# Patient Record
Sex: MALE | NOT HISPANIC OR LATINO | Employment: FULL TIME
[De-identification: names, ages, dates, MRNs, and addresses within clinical notes are randomized per-mention and may not be internally consistent; named-entity substitution may affect disease eponyms.]

---

## 2022-06-22 ENCOUNTER — TRANSCRIBE ORDERS (OUTPATIENT)
Dept: OTHER | Age: 37
End: 2022-06-22

## 2022-06-22 DIAGNOSIS — I21.02 STEMI INVOLVING LEFT ANTERIOR DESCENDING CORONARY ARTERY (H): Primary | ICD-10-CM

## 2022-07-01 ENCOUNTER — HOSPITAL ENCOUNTER (OUTPATIENT)
Dept: CARDIAC REHAB | Facility: HOSPITAL | Age: 37
Discharge: HOME | End: 2022-07-01
Attending: INTERNAL MEDICINE
Payer: COMMERCIAL

## 2022-07-01 DIAGNOSIS — I21.02 STEMI INVOLVING LEFT ANTERIOR DESCENDING CORONARY ARTERY (H): ICD-10-CM

## 2022-07-01 PROCEDURE — 93798 PHYS/QHP OP CAR RHAB W/ECG: CPT

## 2022-07-01 PROCEDURE — 93797 PHYS/QHP OP CAR RHAB WO ECG: CPT

## 2022-07-11 ENCOUNTER — HOSPITAL ENCOUNTER (OUTPATIENT)
Dept: CARDIAC REHAB | Facility: HOSPITAL | Age: 37
Discharge: HOME | End: 2022-07-11
Attending: INTERNAL MEDICINE
Payer: COMMERCIAL

## 2022-07-11 PROCEDURE — 93798 PHYS/QHP OP CAR RHAB W/ECG: CPT

## 2022-07-20 ENCOUNTER — HOSPITAL ENCOUNTER (OUTPATIENT)
Dept: CARDIAC REHAB | Facility: HOSPITAL | Age: 37
Discharge: HOME | End: 2022-07-20
Attending: INTERNAL MEDICINE
Payer: COMMERCIAL

## 2022-07-24 ENCOUNTER — HEALTH MAINTENANCE LETTER (OUTPATIENT)
Age: 37
End: 2022-07-24

## 2022-07-25 ENCOUNTER — APPOINTMENT (OUTPATIENT)
Dept: CARDIOLOGY | Facility: HOSPITAL | Age: 37
End: 2022-07-25
Attending: INTERNAL MEDICINE
Payer: COMMERCIAL

## 2022-07-25 ENCOUNTER — HOSPITAL ENCOUNTER (OUTPATIENT)
Dept: CARDIAC REHAB | Facility: HOSPITAL | Age: 37
Discharge: HOME | End: 2022-07-25
Attending: INTERNAL MEDICINE
Payer: COMMERCIAL

## 2022-07-25 ENCOUNTER — APPOINTMENT (OUTPATIENT)
Dept: RADIOLOGY | Facility: HOSPITAL | Age: 37
End: 2022-07-25
Attending: EMERGENCY MEDICINE
Payer: COMMERCIAL

## 2022-07-25 ENCOUNTER — HOSPITAL ENCOUNTER (OUTPATIENT)
Facility: HOSPITAL | Age: 37
Setting detail: OBSERVATION
Discharge: HOME | End: 2022-07-26
Attending: EMERGENCY MEDICINE | Admitting: STUDENT IN AN ORGANIZED HEALTH CARE EDUCATION/TRAINING PROGRAM
Payer: COMMERCIAL

## 2022-07-25 DIAGNOSIS — R55 SYNCOPE, UNSPECIFIED SYNCOPE TYPE: ICD-10-CM

## 2022-07-25 DIAGNOSIS — I25.5 ISCHEMIC CARDIOMYOPATHY: Primary | ICD-10-CM

## 2022-07-25 PROBLEM — I21.02: Status: ACTIVE | Noted: 2022-07-25

## 2022-07-25 PROBLEM — R07.9 CHEST PAIN: Status: ACTIVE | Noted: 2022-06-13

## 2022-07-25 LAB
ANION GAP SERPL CALCULATED.3IONS-SCNC: 13 MMOL/L (ref 5–18)
ATRIAL RATE - MUSE: 104 BPM
BASE EXCESS BLDV CALC-SCNC: 2.3 MMOL/L
BASOPHILS # BLD AUTO: 0.1 10E3/UL (ref 0–0.2)
BASOPHILS NFR BLD AUTO: 0 %
BUN SERPL-MCNC: 14 MG/DL (ref 8–22)
CALCIUM SERPL-MCNC: 9.8 MG/DL (ref 8.5–10.5)
CHLORIDE BLD-SCNC: 102 MMOL/L (ref 98–107)
CO2 SERPL-SCNC: 22 MMOL/L (ref 22–31)
CREAT SERPL-MCNC: 1.25 MG/DL (ref 0.7–1.3)
CREAT SERPL-MCNC: 1.25 MG/DL (ref 0.7–1.3)
D DIMER PPP FEU-MCNC: <0.27 UG/ML FEU (ref 0–0.5)
DIASTOLIC BLOOD PRESSURE - MUSE: 54 MMHG
EOSINOPHIL # BLD AUTO: 0.5 10E3/UL (ref 0–0.7)
EOSINOPHIL NFR BLD AUTO: 4 %
ERYTHROCYTE [DISTWIDTH] IN BLOOD BY AUTOMATED COUNT: 12.6 % (ref 10–15)
GFR SERPL CREATININE-BSD FRML MDRD: 77 ML/MIN/1.73M2
GFR SERPL CREATININE-BSD FRML MDRD: 77 ML/MIN/1.73M2
GLUCOSE BLD-MCNC: 137 MG/DL (ref 70–125)
HCO3 BLDV-SCNC: 24 MMOL/L (ref 24–30)
HCT VFR BLD AUTO: 44 % (ref 40–53)
HGB BLD-MCNC: 14.3 G/DL (ref 13.3–17.7)
HOLD SPECIMEN: NORMAL
IMM GRANULOCYTES # BLD: 0.1 10E3/UL
IMM GRANULOCYTES NFR BLD: 0 %
INTERPRETATION ECG - MUSE: NORMAL
LACTATE SERPL-SCNC: 2 MMOL/L (ref 0.7–2)
LACTATE SERPL-SCNC: 2.2 MMOL/L (ref 0.7–2)
LVEF ECHO: NORMAL
LYMPHOCYTES # BLD AUTO: 3.7 10E3/UL (ref 0.8–5.3)
LYMPHOCYTES NFR BLD AUTO: 28 %
MCH RBC QN AUTO: 26.3 PG (ref 26.5–33)
MCHC RBC AUTO-ENTMCNC: 32.5 G/DL (ref 31.5–36.5)
MCV RBC AUTO: 81 FL (ref 78–100)
MONOCYTES # BLD AUTO: 1.1 10E3/UL (ref 0–1.3)
MONOCYTES NFR BLD AUTO: 9 %
NEUTROPHILS # BLD AUTO: 7.6 10E3/UL (ref 1.6–8.3)
NEUTROPHILS NFR BLD AUTO: 59 %
NRBC # BLD AUTO: 0 10E3/UL
NRBC BLD AUTO-RTO: 0 /100
OXYHGB MFR BLDV: 33.1 % (ref 70–75)
P AXIS - MUSE: 33 DEGREES
PCO2 BLDV: 48 MM HG (ref 35–50)
PH BLDV: 7.37 [PH] (ref 7.35–7.45)
PLATELET # BLD AUTO: 399 10E3/UL (ref 150–450)
PO2 BLDV: 22 MM HG (ref 25–47)
POTASSIUM BLD-SCNC: 3.5 MMOL/L (ref 3.5–5)
PR INTERVAL - MUSE: 144 MS
QRS DURATION - MUSE: 94 MS
QT - MUSE: 338 MS
QTC - MUSE: 444 MS
R AXIS - MUSE: -14 DEGREES
RBC # BLD AUTO: 5.44 10E6/UL (ref 4.4–5.9)
SAO2 % BLDV: 33.7 % (ref 70–75)
SARS-COV-2 RNA RESP QL NAA+PROBE: NEGATIVE
SODIUM SERPL-SCNC: 137 MMOL/L (ref 136–145)
SYSTOLIC BLOOD PRESSURE - MUSE: 106 MMHG
T AXIS - MUSE: 158 DEGREES
TROPONIN I SERPL-MCNC: 0.03 NG/ML (ref 0–0.29)
TROPONIN I SERPL-MCNC: 0.03 NG/ML (ref 0–0.29)
TROPONIN I SERPL-MCNC: 0.04 NG/ML (ref 0–0.29)
VENTRICULAR RATE- MUSE: 104 BPM
WBC # BLD AUTO: 12.9 10E3/UL (ref 4–11)

## 2022-07-25 PROCEDURE — C9803 HOPD COVID-19 SPEC COLLECT: HCPCS

## 2022-07-25 PROCEDURE — 255N000002 HC RX 255 OP 636: Performed by: STUDENT IN AN ORGANIZED HEALTH CARE EDUCATION/TRAINING PROGRAM

## 2022-07-25 PROCEDURE — 99255 IP/OBS CONSLTJ NEW/EST HI 80: CPT | Mod: 25 | Performed by: INTERNAL MEDICINE

## 2022-07-25 PROCEDURE — 120N000001 HC R&B MED SURG/OB

## 2022-07-25 PROCEDURE — 84484 ASSAY OF TROPONIN QUANT: CPT | Performed by: EMERGENCY MEDICINE

## 2022-07-25 PROCEDURE — 250N000013 HC RX MED GY IP 250 OP 250 PS 637

## 2022-07-25 PROCEDURE — G0378 HOSPITAL OBSERVATION PER HR: HCPCS

## 2022-07-25 PROCEDURE — 84484 ASSAY OF TROPONIN QUANT: CPT

## 2022-07-25 PROCEDURE — 84520 ASSAY OF UREA NITROGEN: CPT | Performed by: EMERGENCY MEDICINE

## 2022-07-25 PROCEDURE — U0005 INFEC AGEN DETEC AMPLI PROBE: HCPCS | Performed by: EMERGENCY MEDICINE

## 2022-07-25 PROCEDURE — 96360 HYDRATION IV INFUSION INIT: CPT

## 2022-07-25 PROCEDURE — 83605 ASSAY OF LACTIC ACID: CPT | Performed by: EMERGENCY MEDICINE

## 2022-07-25 PROCEDURE — 250N000013 HC RX MED GY IP 250 OP 250 PS 637: Performed by: INTERNAL MEDICINE

## 2022-07-25 PROCEDURE — 85025 COMPLETE CBC W/AUTO DIFF WBC: CPT | Performed by: EMERGENCY MEDICINE

## 2022-07-25 PROCEDURE — 99285 EMERGENCY DEPT VISIT HI MDM: CPT | Mod: 25

## 2022-07-25 PROCEDURE — 82805 BLOOD GASES W/O2 SATURATION: CPT | Performed by: EMERGENCY MEDICINE

## 2022-07-25 PROCEDURE — 85379 FIBRIN DEGRADATION QUANT: CPT | Performed by: EMERGENCY MEDICINE

## 2022-07-25 PROCEDURE — 36415 COLL VENOUS BLD VENIPUNCTURE: CPT

## 2022-07-25 PROCEDURE — 36415 COLL VENOUS BLD VENIPUNCTURE: CPT | Performed by: EMERGENCY MEDICINE

## 2022-07-25 PROCEDURE — 96361 HYDRATE IV INFUSION ADD-ON: CPT

## 2022-07-25 PROCEDURE — 71046 X-RAY EXAM CHEST 2 VIEWS: CPT

## 2022-07-25 PROCEDURE — 93005 ELECTROCARDIOGRAM TRACING: CPT | Performed by: EMERGENCY MEDICINE

## 2022-07-25 PROCEDURE — 258N000003 HC RX IP 258 OP 636: Performed by: EMERGENCY MEDICINE

## 2022-07-25 PROCEDURE — 93798 PHYS/QHP OP CAR RHAB W/ECG: CPT

## 2022-07-25 PROCEDURE — 83605 ASSAY OF LACTIC ACID: CPT

## 2022-07-25 PROCEDURE — 93306 TTE W/DOPPLER COMPLETE: CPT | Mod: 26 | Performed by: GENERAL ACUTE CARE HOSPITAL

## 2022-07-25 PROCEDURE — 99219 PR INITIAL OBSERVATION CARE,LEVEL II: CPT | Mod: GC

## 2022-07-25 RX ORDER — PROCHLORPERAZINE 25 MG
25 SUPPOSITORY, RECTAL RECTAL EVERY 12 HOURS PRN
Status: DISCONTINUED | OUTPATIENT
Start: 2022-07-25 | End: 2022-07-26 | Stop reason: HOSPADM

## 2022-07-25 RX ORDER — LOSARTAN POTASSIUM 25 MG/1
12.5 TABLET ORAL DAILY
COMMUNITY

## 2022-07-25 RX ORDER — METOPROLOL SUCCINATE 25 MG/1
25 TABLET, EXTENDED RELEASE ORAL 2 TIMES DAILY
Qty: 30 TABLET | Refills: 0
Start: 2022-07-25

## 2022-07-25 RX ORDER — ASPIRIN 81 MG/1
81 TABLET, CHEWABLE ORAL DAILY
COMMUNITY

## 2022-07-25 RX ORDER — ASPIRIN 81 MG/1
81 TABLET, CHEWABLE ORAL DAILY
Status: DISCONTINUED | OUTPATIENT
Start: 2022-07-26 | End: 2022-07-26 | Stop reason: HOSPADM

## 2022-07-25 RX ORDER — ENOXAPARIN SODIUM 100 MG/ML
40 INJECTION SUBCUTANEOUS EVERY 24 HOURS
Status: DISCONTINUED | OUTPATIENT
Start: 2022-07-25 | End: 2022-07-26 | Stop reason: HOSPADM

## 2022-07-25 RX ORDER — ACETAMINOPHEN 325 MG/1
650 TABLET ORAL EVERY 6 HOURS PRN
Status: DISCONTINUED | OUTPATIENT
Start: 2022-07-25 | End: 2022-07-26 | Stop reason: HOSPADM

## 2022-07-25 RX ORDER — ONDANSETRON 4 MG/1
4 TABLET, ORALLY DISINTEGRATING ORAL EVERY 6 HOURS PRN
Status: DISCONTINUED | OUTPATIENT
Start: 2022-07-25 | End: 2022-07-26 | Stop reason: HOSPADM

## 2022-07-25 RX ORDER — SODIUM CHLORIDE 9 MG/ML
INJECTION, SOLUTION INTRAVENOUS CONTINUOUS
Status: DISCONTINUED | OUTPATIENT
Start: 2022-07-25 | End: 2022-07-26 | Stop reason: HOSPADM

## 2022-07-25 RX ORDER — ROSUVASTATIN CALCIUM 40 MG/1
40 TABLET, COATED ORAL DAILY
Status: DISCONTINUED | OUTPATIENT
Start: 2022-07-26 | End: 2022-07-26 | Stop reason: HOSPADM

## 2022-07-25 RX ORDER — ROSUVASTATIN CALCIUM 40 MG/1
40 TABLET, COATED ORAL DAILY
COMMUNITY

## 2022-07-25 RX ORDER — ACETAMINOPHEN 650 MG/1
650 SUPPOSITORY RECTAL EVERY 6 HOURS PRN
Status: DISCONTINUED | OUTPATIENT
Start: 2022-07-25 | End: 2022-07-26 | Stop reason: HOSPADM

## 2022-07-25 RX ORDER — METOPROLOL SUCCINATE 25 MG/1
37.5 TABLET, EXTENDED RELEASE ORAL 2 TIMES DAILY
COMMUNITY
End: 2022-07-25

## 2022-07-25 RX ORDER — ONDANSETRON 2 MG/ML
4 INJECTION INTRAMUSCULAR; INTRAVENOUS EVERY 6 HOURS PRN
Status: DISCONTINUED | OUTPATIENT
Start: 2022-07-25 | End: 2022-07-26 | Stop reason: HOSPADM

## 2022-07-25 RX ORDER — LIDOCAINE 40 MG/G
CREAM TOPICAL
Status: DISCONTINUED | OUTPATIENT
Start: 2022-07-25 | End: 2022-07-26 | Stop reason: HOSPADM

## 2022-07-25 RX ORDER — PROCHLORPERAZINE MALEATE 10 MG
10 TABLET ORAL EVERY 6 HOURS PRN
Status: DISCONTINUED | OUTPATIENT
Start: 2022-07-25 | End: 2022-07-26 | Stop reason: HOSPADM

## 2022-07-25 RX ORDER — METOPROLOL SUCCINATE 25 MG/1
25 TABLET, EXTENDED RELEASE ORAL 2 TIMES DAILY
Status: DISCONTINUED | OUTPATIENT
Start: 2022-07-25 | End: 2022-07-26 | Stop reason: HOSPADM

## 2022-07-25 RX ADMIN — TICAGRELOR 90 MG: 90 TABLET ORAL at 20:54

## 2022-07-25 RX ADMIN — SODIUM CHLORIDE: 9 INJECTION, SOLUTION INTRAVENOUS at 09:04

## 2022-07-25 RX ADMIN — SODIUM CHLORIDE 1000 ML: 9 INJECTION, SOLUTION INTRAVENOUS at 07:57

## 2022-07-25 RX ADMIN — METOPROLOL SUCCINATE 25 MG: 25 TABLET, EXTENDED RELEASE ORAL at 20:55

## 2022-07-25 RX ADMIN — PERFLUTREN 2.5 ML: 6.52 INJECTION, SUSPENSION INTRAVENOUS at 16:20

## 2022-07-25 ASSESSMENT — ACTIVITIES OF DAILY LIVING (ADL)
ADLS_ACUITY_SCORE: 35

## 2022-07-25 NOTE — ED PROVIDER NOTES
EMERGENCY DEPARTMENT ENCOUNTER      NAME: Jordy Mcmanus  AGE: 36 year old male  YOB: 1985  MRN: [id number]  EVALUATION DATE & TIME: 7/25/2022  7:30 AM    PCP: System, Provider Not In    ED PROVIDER: Oskar Baker M.D.      Chief Complaint   Patient presents with     Syncope     SYNCOPE X3         FINAL IMPRESSION:  1. Syncope, unspecified syncope type          ED COURSE & MEDICAL DECISION MAKING:    Pertinent Labs & Imaging studies reviewed. (See chart for details)  36 year old male presents to the Emergency Department for evaluation of syncope. Patient appears non toxic with stable vitals signs, patient is afebrile, did note slight tachycardia but no hypoxia or increased work of breathing. Overall exam is benign.  Lungs are clear and abdomen is benign.  Patient denies any chest pain at any time today, no shortness of breath.  He was finishing his exercises in cardiac rehab when he had 3 reported episodes of syncope.  Did strike his head but he denies any head or neck pain, he is GCS of 15 with no focal neurodeficits.  Review of the medical record shows that in June 2022 patient presented to outside facility for left upper back and shoulder pain and was found to have STEMI, coronary angiogram demonstrated occluded LAD, during his coronary angiogram he went into cardiogenic shock and patient had emergent PCI.  He denies any fevers, change in bowel or bladder habits.  Considered but low suspicion for PE, concern for dysrhythmia, ACS, considered but low suspicion for anemia or electrolyte abnormality.  Patient has small forehead abrasion but no other outward signs of trauma, again denies any head or neck pain with a normal neurologic exam with certainly nothing to suggest depressed skull fracture, intracranial bleed or mass.  No C-spine tenderness.  Nothing to suggest cervical fracture or dislocation.  We will obtain screening labs, ECG, D-dimer and chest x-ray.    Reassessment: Troponin negative and  ECG nonischemic, did note slightly elevated lactic acid and white blood cell count of 12.9 but again no fever here or other infectious type symptoms, certainly nothing to suggest sepsis.  D-dimer was negative.  COVID-negative.  Plain films reported no acute concerning findings.  Repeat exam is benign.  That said, patient does have significant cardiac risk factors and has had recent cardiac intervention and feel he would benefit from admission for serial troponins and continued observation.  Discussed these findings recommendations with the patient who is in agreement.  Discussed care plans admitting service.    At the conclusion of the encounter I discussed the results of all of the tests and the disposition. The questions were answered and return precautions provided. The patient or family acknowledged understanding and was agreeable with the care plan.     8:15 AM I introduced myself to the patient, obtained patient history, performed a physical exam, and discussed plan for ED workup including potential diagnostic laboratory/imaging studies and interventions.     8:49 AM Rechecked and updated patient.  Patient appears comfortable but will admit for continued observation.    9:03 AM Spoke with [place] about admission to [facility].      MEDICATIONS GIVEN IN THE EMERGENCY:  Medications   0.9% sodium chloride BOLUS (0 mLs Intravenous Stopped 7/25/22 0905)     Followed by   sodium chloride 0.9% infusion ( Intravenous Rate/Dose Verify 7/25/22 1337)   lidocaine 1 % 0.1-1 mL (has no administration in time range)   lidocaine (LMX4) cream (has no administration in time range)   sodium chloride (PF) 0.9% PF flush 3 mL (3 mLs Intracatheter Not Given 7/25/22 0956)   sodium chloride (PF) 0.9% PF flush 3 mL (has no administration in time range)   melatonin tablet 1 mg (has no administration in time range)   enoxaparin ANTICOAGULANT (LOVENOX) injection 40 mg (40 mg Subcutaneous Not Given 7/25/22 1207)    acetaminophen (TYLENOL) tablet 650 mg (has no administration in time range)     Or   acetaminophen (TYLENOL) Suppository 650 mg (has no administration in time range)   ondansetron (ZOFRAN ODT) ODT tab 4 mg (has no administration in time range)     Or   ondansetron (ZOFRAN) injection 4 mg (has no administration in time range)   prochlorperazine (COMPAZINE) injection 10 mg (has no administration in time range)     Or   prochlorperazine (COMPAZINE) tablet 10 mg (has no administration in time range)     Or   prochlorperazine (COMPAZINE) suppository 25 mg (has no administration in time range)   aspirin (ASA) chewable tablet 81 mg (has no administration in time range)   losartan (COZAAR) half-tab 12.5 mg (has no administration in time range)   metoprolol succinate ER (TOPROL-XL) 24 hr half-tab 37.5 mg (has no administration in time range)   rosuvastatin (CRESTOR) tablet 40 mg (has no administration in time range)   ticagrelor (BRILINTA) tablet 90 mg (has no administration in time range)       NEW PRESCRIPTIONS STARTED AT TODAY'S ER VISIT  New Prescriptions    No medications on file            =================================================================    HPI    Patient information was obtained from: patient    Use of Intrepreter: N/A        Jordy Mcmanus is a 36 year old male with a past medical history of STEMI, chest pain, and ischemic cardiomyopathy who presents for evaluation of syncope. Patient arrived from cardiac rehab fter Rapid Response was called. Patient experienced three episodes of syncope. He also fell forward out of his chair and hit his head, causing an abrasion between his eyes. Patient says he thinks he passed out because of the mask they have him wear during exercise.    Upon encounter, patient denies chest pain and shortness of breath.    Per chart review, patient was seen on 6/12/22 by [facility] for evaluation of left upper back and shoulder pain. EKG showed ST elevation in anterior leads. During  the diagnostic coronary angiogram, the patient's blood  pressure deteriorated consistent with cardiogenic shock. The anterior STEMI  was aborted with PTCA to reestablish flow in the culprit LAD. Following  establishment of coronary flow to the LAD. Dr. Irvin Villanueva of CT surgery was consulted by phone to discuss options of emergent coronary artery bypass grafting. Based on the hour of the day it would have taken greater than 2 hours to mobilized the CT surgery team and revascularize the coronary arteries. As a result the recommendation was to proceed with emergent PCI due to cardiogenic shock.      REVIEW OF SYSTEMS   Constitutional:  Denies fever, chills  Respiratory:  Denies productive cough, shortness of breath, or increased work of breathing  Cardiovascular:  Denies chest pain, palpitations. Positive for syncope.  GI:  Denies abdominal pain, nausea, vomiting, or change in bowel or bladder habits   Musculoskeletal:  Denies any new muscle/joint swelling  Skin:  Denies rash. Positive for abrasion.   Neurologic:  Denies focal weakness  All systems negative except as marked.     PAST MEDICAL HISTORY:  History reviewed. No pertinent past medical history.    PAST SURGICAL HISTORY:  History reviewed. No pertinent surgical history.      CURRENT MEDICATIONS:    Prior to Admission medications    Not on File        ALLERGIES:  No Known Allergies    FAMILY HISTORY:  History reviewed. No pertinent family history.    SOCIAL HISTORY:   Social History     Socioeconomic History     Marital status: Single       VITALS:  Patient Vitals for the past 24 hrs:   BP Temp Temp src Pulse Resp SpO2 Height Weight   07/25/22 1100 102/61 -- -- 77 12 100 % -- --   07/25/22 0910 -- -- -- -- -- -- 1.829 m (6') 81.2 kg (179 lb)   07/25/22 0909 -- 98.7  F (37.1  C) Oral -- -- -- -- --   07/25/22 0900 106/68 -- -- 88 17 100 % -- --   07/25/22 0731 106/54 -- -- 105 16 99 % -- --        PHYSICAL EXAM    Constitutional:  Awake, alert, in no apparent  distress  HENT:  Normocephalic, Atraumatic. Bilateral external ears normal. Oropharynx moist. Nose normal. Neck- Normal range of motion with no guarding, No midline cervical tenderness, Supple, No stridor.   Eyes:  PERRL, EOMI with no signs of entrapment, Conjunctiva normal, No discharge.   Respiratory:  Normal breath sounds, No respiratory distress, No wheezing.    Cardiovascular:  Tachycardic, Normal rhythm, No appreciable rubs or gallops.   GI:  Soft, No tenderness, No distension, No palpable masses  Musculoskeletal:  Intact distal pulses, No edema. Good range of motion in all major joints. No tenderness to palpation or major deformities noted.  Integument:  Warm, Dry, No erythema, No rash. Moderate superficial abrasion to central forehead.  Neurologic:  Alert & oriented, Normal motor function, Normal sensory function, No focal deficits noted.   Psychiatric:  Affect normal, Judgment normal, Mood normal.     LAB:  All pertinent labs reviewed and interpreted.  Results for orders placed or performed during the hospital encounter of 07/25/22   XR Chest 2 Views    Impression    IMPRESSION: The lungs are clear. There is no pneumothorax or pleural effusion. The heart size and pulmonary vascularity are normal. No displaced bony fracture. No free air under the diaphragm. Negative chest radiograph.   Extra Blue Top Tube   Result Value Ref Range    Hold Specimen JIC    Extra Red Top Tube   Result Value Ref Range    Hold Specimen JIC    Extra Green Top (Lithium Heparin) Tube   Result Value Ref Range    Hold Specimen JIC    Extra Purple Top Tube   Result Value Ref Range    Hold Specimen JIC    D dimer quantitative   Result Value Ref Range    D-Dimer Quantitative <0.27 0.00 - 0.50 ug/mL FEU   Basic metabolic panel   Result Value Ref Range    Sodium 137 136 - 145 mmol/L    Potassium 3.5 3.5 - 5.0 mmol/L    Chloride 102 98 - 107 mmol/L    Carbon Dioxide (CO2) 22 22 - 31 mmol/L    Anion Gap 13 5 - 18 mmol/L    Urea Nitrogen 14 8  - 22 mg/dL    Creatinine 1.25 0.70 - 1.30 mg/dL    Calcium 9.8 8.5 - 10.5 mg/dL    Glucose 137 (H) 70 - 125 mg/dL    GFR Estimate 77 >60 mL/min/1.73m2   Result Value Ref Range    Troponin I 0.04 0.00 - 0.29 ng/mL   Lactic acid whole blood   Result Value Ref Range    Lactic Acid 2.2 (H) 0.7 - 2.0 mmol/L   Blood gas venous   Result Value Ref Range    pH Venous 7.37 7.35 - 7.45    pCO2 Venous 48 35 - 50 mm Hg    pO2 Venous 22 (L) 25 - 47 mm Hg    Bicarbonate Venous 24 24 - 30 mmol/L    Base Excess/Deficit (+/-) 2.3   mmol/L    Oxyhemoglobin Venous 33.1 (L) 70.0 - 75.0 %    O2 Sat, Venous 33.7 (L) 70.0 - 75.0 %   CBC with platelets and differential   Result Value Ref Range    WBC Count 12.9 (H) 4.0 - 11.0 10e3/uL    RBC Count 5.44 4.40 - 5.90 10e6/uL    Hemoglobin 14.3 13.3 - 17.7 g/dL    Hematocrit 44.0 40.0 - 53.0 %    MCV 81 78 - 100 fL    MCH 26.3 (L) 26.5 - 33.0 pg    MCHC 32.5 31.5 - 36.5 g/dL    RDW 12.6 10.0 - 15.0 %    Platelet Count 399 150 - 450 10e3/uL    % Neutrophils 59 %    % Lymphocytes 28 %    % Monocytes 9 %    % Eosinophils 4 %    % Basophils 0 %    % Immature Granulocytes 0 %    NRBCs per 100 WBC 0 <1 /100    Absolute Neutrophils 7.6 1.6 - 8.3 10e3/uL    Absolute Lymphocytes 3.7 0.8 - 5.3 10e3/uL    Absolute Monocytes 1.1 0.0 - 1.3 10e3/uL    Absolute Eosinophils 0.5 0.0 - 0.7 10e3/uL    Absolute Basophils 0.1 0.0 - 0.2 10e3/uL    Absolute Immature Granulocytes 0.1 <=0.4 10e3/uL    Absolute NRBCs 0.0 10e3/uL   Asymptomatic COVID-19 Virus (Coronavirus) by PCR Nasopharyngeal    Specimen: Nasopharyngeal; Swab   Result Value Ref Range    SARS CoV2 PCR Negative Negative, Testing sent to reference lab. Results will be returned via unsolicited result   Result Value Ref Range    Troponin I 0.03 0.00 - 0.29 ng/mL   Creatinine   Result Value Ref Range    Creatinine 1.25 0.70 - 1.30 mg/dL    GFR Estimate 77 >60 mL/min/1.73m2   Lactic acid whole blood   Result Value Ref Range    Lactic Acid 2.0 0.7 - 2.0 mmol/L    ECG 12-LEAD WITH MUSE (LHE)   Result Value Ref Range    Systolic Blood Pressure 106 mmHg    Diastolic Blood Pressure 54 mmHg    Ventricular Rate 104 BPM    Atrial Rate 104 BPM    ME Interval 144 ms    QRS Duration 94 ms     ms    QTc 444 ms    P Axis 33 degrees    R AXIS -14 degrees    T Axis 158 degrees    Interpretation ECG       Sinus tachycardia  Septal infarct , age undetermined  T wave abnormality, consider anterolateral ischemia  Abnormal ECG  No previous ECGs available  Confirmed by SEE ED PROVIDER NOTE FOR, ECG INTERPRETATION ([id number]),  PRUDENCIO MENDEZ ([id number]) on 7/25/2022 11:22:26 AM         RADIOLOGY:  XR Chest 2 Views   Final Result   IMPRESSION: The lungs are clear. There is no pneumothorax or pleural effusion. The heart size and pulmonary vascularity are normal. No displaced bony fracture. No free air under the diaphragm. Negative chest radiograph.      Echocardiogram Complete    (Results Pending)          EKG:    Sinus tachycardia, T wave inversion noted in V2 and V3 as well as V5, lead I, no specific ST acute ischemic changes, no priors for comparison within our system, could not access image of ECG from outside facility  I have independently reviewed and interpreted the EKG(s) documented above.    PROCEDURES:          I, Sara Valladares, am serving as a scribe to document services personally performed by Oskar Baker MD, based on my observation and the provider's statements to me. I, Oskar Baker MD attest that Sara Valladares is acting in a scribe capacity, has observed my performance of the services and has documented them in accordance with my direction.    Oskar Baker M.D.  Emergency Medicine  [facility] EMERGENCY DEPARTMENT  [facility] [facility] [id number] [phone]  Dept: [phone]     Oskar Baker MD  07/25/22 6135

## 2022-07-25 NOTE — ED TRIAGE NOTES
Pt arrived from cardiac rehab after Rapid Response was called. Pt had 3 episodes of syncope and fell forward out of chair and hit his head. There is a small laceration inbetween his eyebrows. Pt alert and oriented.     Triage Assessment     Row Name 07/25/22 3165       Triage Assessment (Adult)    Airway WDL WDL       Respiratory WDL    Respiratory WDL WDL       Skin Circulation/Temperature WDL    Skin Circulation/Temperature WDL --  Laceration on forehead        Cardiac WDL    Cardiac WDL all    Cardiac Rhythm tachycardic       Cognitive/Neuro/Behavioral WDL    Cognitive/Neuro/Behavioral WDL WDL

## 2022-07-25 NOTE — CONSULTS
[facility] team is asked to see Jordy Mcmanus in consultation to evaluate syncope x3 in cardiac rehab, recent STEMI.      Assessment:     1. 36-year-old gentleman with history of cardiogenic shock last month associated with chronic total occlusion of the right coronary artery and acute occlusion of the LAD, with emergent percutaneous intervention to the LAD and circumflex OM.  Severe LV dysfunction noted postprocedure, with stable hemodynamics since that time.  Now with syncopal episode which was not associated with tachyarrhythmia, sinus bradycardia developing.  No complaints of chest pain but with prior myocardial infarction had no anginal symptoms either.  Suspect dehydration with vasovagal.  No evidence of an arrhythmic etiology.  RCA ischemia, however could provoke bradycardia.  2. Ischemic cardiomyopathy with LVEF 32% last month.  We will recheck echocardiogram looking for improvement in function following revascularization  3. Hyperlipidemia on treatment     Plan:     1. Recheck troponin  2.  Echocardiogram today looking for improvement in left ventricular systolic function.  3 if LVEF stable or improved, and troponins normal, could safely be discharged home with close follow-up with his primary cardiologist.  4.  Advised to keep his heart rate under 130 bpm with exercise during cardiac rehab at this point and going forward.  5.  Decrease metoprolol to 25 mg twice daily     Current History:     Jordy Mcmanus is a 36-year-old gentleman  originally from [place] with a history of coronary artery disease who presented last month to [facility] with ST elevation myocardial infarction.  Emergent coronary angiography revealed chronic total occlusion of the right coronary with 90% circumflex OM occlusion and acute thrombosis of proximal LAD, complicated by cardiogenic shock.  He underwent intervention to the LAD and circumflex vessels with resolute NIKOLAI,  along with balloon pump placement echocardiogram revealed an ejection fraction of 32%.  He was discharged on aspirin and Brilinta Crestor losartan and metoprolol 37.5 mg twice daily 6/12/2022.  He was noted to have nonsustained ventricular tachycardia during his hospitalization.    Today, at cardiac rehab patient completed his exercise protocol and then felt dizzy sitting in his chair, lost consciousness and collapsed to the ground.  He had to recurrent episodes, unresponsive for 10 seconds with each episode.  He did strike his forehead during 1 of these episodes, with a laceration there.  Telemetry monitoring showed sinus rhythm in the 70s, dropping to 44 beats per minute.  No ventricular arrhythmias or prolonged pauses were noted.    He did not eat prior to his rehab this morning, and feels that he was dehydrated.  He has been walking 30 to 40 minutes on a daily basis with no difficulty.  He also had no symptoms of chest pain or exertional dyspnea prior to his presentation last month.  He does report a history of syncope as a teenager associated with dehydration during prayers, but not since that time.  He denies any nausea today, but noted that after doing arm weights his heart rate reached 160 and he felt sweaty.  He denies any chest pain, or history of chest pain.    There have been no medication changes since his hospital discharge, he does plan to follow-up at [facility] next month.    Past Medical History:   History reviewed. No pertinent past medical history.  Sleep history:   Past Surgical History:   History reviewed. No pertinent surgical history.    Family History:   History reviewed. No pertinent family history.  Family history reviewed and is not pertinent to the chief complaint or presenting problem     Social History:      Exercise history: Walked 20 minutes occasionally prior to his myocardial infarction, 30 to 40 minutes daily since that time with no problems.  He does generally monitor his  heart rate which is maintained less than 120 beats a minute.    Meds:     Current Outpatient Medications   Medication Sig Dispense Refill     aspirin (ASA) 81 MG chewable tablet Take 81 mg by mouth daily       losartan (COZAAR) 25 MG tablet Take 12.5 mg by mouth daily       metoprolol succinate ER (TOPROL XL) 25 MG 24 hr tablet Take 37.5 mg by mouth 2 times daily       rosuvastatin (CRESTOR) 40 MG tablet Take 40 mg by mouth daily       ticagrelor (BRILINTA) 90 MG tablet Take 90 mg by mouth 2 times daily         Allergies:   Patient has no known allergies.    Review of Systems:   A 12 point comprehensive review of systems was negative except as noted in the current history.    Objective:      Physical Exam  Wt Readings from Last 3 Encounters:   07/25/22 81.2 kg (179 lb)     Body mass index is 24.28 kg/m .  /61   Pulse 77   Temp 98.7  F (37.1  C) (Oral)   Resp 12   Ht 1.829 m (6')   Wt 81.2 kg (179 lb)   SpO2 100%   BMI 24.28 kg/m      General Appearance:  No apparent distress.  HEENT: No scleral icterus; the mucous membranes were pink and moist.  Conjunctiva not injected.  Forehead laceration noted  Neck:  No cervical bruits, jugular venous distention, or thyromegaly.  Chest:The spine was straight. The chest was symmetric.  Lungs:  Respirations unlabored; the lungs are clear to auscultation.  Cardiovascular:     Normal point of maximal impulse. Auscultation reveals normal first and second heart sounds with no murmurs, rubs, or gallops. Carotid, radial, and dorsalis pedal pulses are intact and symmetric.  Abdomen:  No organomegaly, masses, bruits, or tenderness. Bowels sounds are present  Extremities: No cyanosis, clubbing, or edema.  Skin:  No xanthelasma.  Neurologic: Mood and affect are appropriate. Speech is fluent.      EKG (personally reviewed):    Sinus tachycardia 104 bpm.  Septal infarct, anterolateral T wave changes suggestive of ischemia.  No prior available for comparison.    Imaging   EXAM:  XR CHEST 2 VIEWS  LOCATION: [facility]  DATE/TIME: 7/25/2022 8:18 AM  INDICATION: syncope  COMPARISON: None.                                                           IMPRESSION: The lungs are clear. There is no pneumothorax or pleural effusion. The heart size and pulmonary vascularity are normal. No displaced bony fracture. No free air under the diaphragm. Negative chest radiograph.      Cardiac diagnostics    Echocardiogram 6/2022 [facility]:    * The left ventricle is normal size.     * The left ventricular systolic function is moderately reduced, quantified   ejection fraction is 32%.     * Akinesis of the septum, distal anterolateral wall, distal inferolateral   wall, mid-distal anterior wall, distal inferior wall with best preserved   function in the inferior and lateral base.     * Quantitatively reduced right ventricular systolic function.     * There is no hemodynamically significant valvular heart disease.     Coronary angiogram with intervention 6/12/2022 [facility]:    1. 100% stenosis of the proximal LAD, which is the culprit vessel for the   STEMI.     2. 50% distal left main stenosis.     3. 90% stenosis of the proximal obtuse marginal.     4. Chronic total occlusion of the mid RCA.     5. COMMENTS: During the diagnostic coronary angiogram, the patient's blood pressure deteriorated consistent with cardiogenic shock.  The anterior STEMI was aborted with PTCA to reestablish flow in the culprit LAD.  Following establishment of coronary flow to the LAD. Dr. Irvin Villanueva of CT surgery was consulted by phone to discuss options of emergent coronary artery bypass   grafting.  Based on the hour of the day it would have taken greater than 2   hours to mobilized the CT surgery team and revascularize the coronary   arteries.  As a result the recommendation was to proceed with emergent PCI due to  cardiogenic shock.   Recommendations     * s/p aspiration thrombectomy with  subsequent PTCA/stent of the LAD with   RESOLUTE NIKOLAI.     * s/p primary stenting of the obtuse marginal with RESOLUTE NIKOLAI.     * Intraaortic balloon pump placement.         Lab Review     Lab Results   Component Value Date    HGB 14.3 07/25/2022     Lab Results   Component Value Date     07/25/2022     Lab Results   Component Value Date    POTASSIUM 3.5 07/25/2022     Lab Results   Component Value Date    BUN 14 07/25/2022     Lab Results   Component Value Date    CR 1.25 07/25/2022    CR 1.25 07/25/2022     No results found for: CHOL, HDL, LDL, TRIG, CHOLHDLRATIO        Azar Bradford MD  [facility]  7/25/2022    Note created using Dragon voice recognition software.  Sound alike errors may have escaped editing.    Clinically Significant Risk Factors Present on Admission                # Platelet Defect: home medication list includes an antiplatelet medication

## 2022-07-25 NOTE — PHARMACY-ADMISSION MEDICATION HISTORY
Pharmacy Note - Admission Medication History    Pertinent Provider Information:      ______________________________________________________________________    Prior To Admission (PTA) med list completed and updated in EMR.       PTA Med List   Medication Sig Last Dose     aspirin (ASA) 81 MG chewable tablet Take 81 mg by mouth daily 7/25/2022 at am     losartan (COZAAR) 25 MG tablet Take 12.5 mg by mouth daily 7/25/2022 at am     metoprolol succinate ER (TOPROL XL) 25 MG 24 hr tablet Take 37.5 mg by mouth 2 times daily 7/25/2022 at am     rosuvastatin (CRESTOR) 40 MG tablet Take 40 mg by mouth daily 7/25/2022 at am     ticagrelor (BRILINTA) 90 MG tablet Take 90 mg by mouth 2 times daily 7/25/2022 at am       Information source(s): Patient and Hospital records  Method of interview communication: in-person    Summary of Changes to PTA Med List  New: all medications  Discontinued: none  Changed: none    Patient was asked about OTC/herbal products specifically.  PTA med list reflects this.    In the past week, patient estimated taking medication this percent of the time:  greater than 90%.    Allergies were reviewed, assessed, and updated with the patient.      Patient does not use any multi-dose medications prior to admission.    The information provided in this note is only as accurate as the sources available at the time of the update(s).    Thank you for the opportunity to participate in the care of this patient.    Audrey Valladares [facility]  7/25/2022 10:17 AM

## 2022-07-25 NOTE — DISCHARGE SUMMARY
[facility]  Discharge Summary - Medicine & Pediatrics       Date of Admission:  7/25/2022  Date of Discharge:  7/26/2022  Discharging Provider: Dr. Ortiz and Dr. Cardona  Discharge Service: Hospitalist Service    Discharge Diagnoses   Syncope, likely dehydration vs. vasovagal    Follow-ups Needed After Discharge   Follow-up with primary cardiologist    Unresulted Labs Ordered in the Past 30 Days of this Admission     No orders found from 6/25/2022 to 7/26/2022.        Discharge Disposition   Discharged to home  Condition at discharge: Good    Hospital Course   Jordy Mcmanus has a history of STEMI s/p PCI in 6/2022 was admitted on 7/25/2022 after a syncopal event today during cardiac rehab. The following problems were addressed during his hospitalization:    Patient presented on 7/25 with three episodes of syncope after just completing cardiac rehab in morning. During syncopal episode patient denied chest pain, shortness of breath, or palpitation. Patient was slightly tachycardic to low 100s. Work-up in ED was rather unremarkable including normal trop, normal d-dimer, normal CXR, normal electrolytes. ECG with sinus tachycardia. Echo from previous admission with EF of 32%. He was seen by cardiology who determined the episodes were not associated with tachyarrhythmias, less likely ACS given negative troponins x3, EKG sinus tachycardia, and repeat ECHO stable/with mild improvement with an EF of 35-40% (prior 32%). Suspect syncopal event was secondary to dehydration and vasovagal. Upon discharge, metoprolol decreased to 25mg BID. Additionally, it is recommended that he not pass 130bpm when in cardiac rehab. He will need close follow-up with primary cardiologist, which is scheduled for 8/9.    Consultations This Hospital Stay   CARDIOLOGY IP CONSULT    Code Status   Full Code       The patient was discussed with Dr. Brendan Ortiz MD  [place] [facility]  [facility]  EMERGENCY DEPARTMENT  [address] [facility] [address]  Phone: [phone]  ______________________________________________________________________    Physical Exam   Vital Signs: Temp: 98.7  F (37.1  C) Temp src: Oral BP: 102/61 Pulse: 77   Resp: 12 SpO2: 100 % O2 Device: None (Room air)    Weight: 179 lbs 0 oz  Constitutional: awake, alert, cooperative, no apparent distress, and appears stated age  ENT: Normocephalic, small superficial abrasions in between eyebrows   Respiratory: No increased work of breathing, good air exchange, clear to auscultation bilaterally, no crackles or wheezing  Cardiovascular:  regular rate and rhythm, normal S1 and S2, no S3 or S4, and no murmur noted  GI: normal bowel sounds, soft, non-distended, non-tender  Skin: no rashes, erythema or lesions  Musculoskeletal: There is no redness, warmth, or swelling of the joints.  Full range of motion noted.   Neurologic: Awake, alert, oriented to name, place and time.  Cranial nerves II-XII are grossly intact.   Neuropsychiatric: General: normal, calm and normal eye contact  Level of consciousness: alert / normal  Affect: normal and pleasant      Primary Care Physician   Provider Not In System    Discharge Orders   No discharge procedures on file.    Significant Results and Procedures   Results for orders placed or performed during the hospital encounter of 07/25/22   XR Chest 2 Views    Narrative    EXAM: XR CHEST 2 VIEWS  LOCATION: [facility]  DATE/TIME: 7/25/2022 8:18 AM    INDICATION: syncope  COMPARISON: None.      Impression    IMPRESSION: The lungs are clear. There is no pneumothorax or pleural effusion. The heart size and pulmonary vascularity are normal. No displaced bony fracture. No free air under the diaphragm. Negative chest radiograph.   Echocardiogram Complete     Value    LVEF  35-40%    Narrative    [id number]  [id number]  [id number]  [id number]^KEN^LORRI^[address]  [place]  [place] [address]     Name: THONY ASHBY  MRN: [id number]  : 1985  Study Date: 2022 03:57 PM  Age: 36 yrs  Gender: Male  Patient Location: [facility]  Reason For Study: CAD, Syncope  Ordering Physician: LORRI ROGERS  Performed By: BOUBACAR     BSA: 2.0 m2  Height: 72 in  Weight: 179 lb  ______________________________________________________________________________  Procedure  Complete Portable Echo Adult. Definity (NDC #60160-325) given intravenously.  Technically difficult study.Extremely difficult acoustic windows despite the  use of contrast for endcardial border definition. There is no comparison study  available.  ______________________________________________________________________________  Interpretation Summary     1. Left ventricular size and wall thickness are normal. Systolic function is  moderately reduced with global hypokinesis. The estimated left ventricular  ejection fraction is 35-40%.  2. Right ventricular size and systolic function are normal.  3. No hemodynamically significant valvular abnormalities.  4. No prior study available for comparison.  ______________________________________________________________________________  I      WMSI = 2.00     % Normal = 0     X - Cannot   0 -                      (2) - Mildly 2 -          Segments  Size  Interpret    Hyperkinetic 1 - Normal  Hypokinetic  Hypokinetic  1-2     small                                                     7 -          3-5      moderate  3 - Akinetic 4 -          5 -         6 - Akinetic Dyskinetic   6-14    large               Dyskinetic   Aneurysmal  w/scar       w/scar       15-16   diffuse     Left Ventricle  The left ventricle is normal in size. There is normal left ventricular wall  thickness. The visual ejection fraction is 35-40%. Grade I or early diastolic  dysfunction. Diastolic Doppler findings (E/E' ratio and/or other parameters)  suggest left ventricular filling pressures are normal. There is  moderate  global hypokinesia of the left ventricle. There is no thrombus seen in the  left ventricle.     Right Ventricle  Normal right ventricle size and systolic function.     Atria  Normal left atrial size. Right atrial size is normal.     Mitral Valve  Mitral valve leaflets appear normal. There is trace mitral regurgitation.  There is no mitral valve stenosis.     Tricuspid Valve  Tricuspid valve leaflets appear normal. There is trace tricuspid  regurgitation. Right ventricular systolic pressure could not be approximated  due to inadequate tricuspid regurgitation. There is no tricuspid stenosis.     Aortic Valve  Aortic valve leaflets appear normal. The aortic valve is trileaflet. No aortic  regurgitation is present. No aortic stenosis is present.     Pulmonic Valve  The pulmonic valve is not well visualized. There is trace pulmonic valvular  regurgitation. There is no pulmonic valvular stenosis.     Vessels  The aorta root is normal. The thoracic aorta cannot be assessed. IVC diameter  <2.1 cm collapsing >50% with sniff suggests a normal RA pressure of 3 mmHg.     Pericardium  There is no pericardial effusion.     Rhythm  Sinus rhythm was noted.  ______________________________________________________________________________  MMode/2D Measurements & Calculations  IVSd: 0.80 cm     LVIDd: 5.4 cm  LVIDs: 4.3 cm  LVPWd: 0.81 cm  FS: 20.0 %  LV mass(C)d: 156.5 grams  LV mass(C)dI: 77.0 grams/m2  Ao root diam: 2.9 cm  LA dimension: 3.5 cm  LA/Ao: 1.2  LVOT diam: 1.9 cm  LVOT area: 2.8 cm2  LA Volume Index (BP): 25.0 ml/m2  RWT: 0.30     Time Measurements  MM HR: 80.0 BPM     Doppler Measurements & Calculations  MV E max esthela: 67.9 cm/sec  MV A max esthela: 82.0 cm/sec  MV E/A: 0.83  MV dec time: 0.21 sec  Ao V2 max: 104.5 cm/sec  Ao max P.0 mmHg  Ao V2 mean: 73.7 cm/sec  Ao mean P.5 mmHg  Ao V2 VTI: 19.2 cm  MICKY(I,D): 2.5 cm2  MICKY(V,D): 2.3 cm2  LV V1 max PG: 3.0 mmHg  LV V1 max: 86.8 cm/sec  LV V1 VTI: 17.4  cm  SV(LVOT): 49.0 ml  SI(LVOT): 24.1 ml/m2  PA acc time: 0.12 sec  AV Sandip Ratio (DI): 0.83  MICKY Index (cm2/m2): 1.3  E/E' av.9  Lateral E/e': 7.2  Medial E/e': 10.6     ______________________________________________________________________________  Report approved by: Monica Ram 2022 05:03 PM               Discharge Medications   Current Discharge Medication List      CONTINUE these medications which have NOT CHANGED    Details   aspirin (ASA) 81 MG chewable tablet Take 81 mg by mouth daily      losartan (COZAAR) 25 MG tablet Take 12.5 mg by mouth daily      metoprolol succinate ER (TOPROL XL) 25 MG 24 hr tablet Take 37.5 mg by mouth 2 times daily      rosuvastatin (CRESTOR) 40 MG tablet Take 40 mg by mouth daily      ticagrelor (BRILINTA) 90 MG tablet Take 90 mg by mouth 2 times daily           Allergies   No Known Allergies

## 2022-07-25 NOTE — H&P
[facility]    History and Physical - Hospitalist Service       Date of Admission:  7/25/2022    Assessment & Plan      Jordy Mcmanus is a 36 year old male admitted on 7/25/2022. He has history of STEMI in June and was undergoing cardiac rehab here when he had 3 episodes of syncope. Admitted for telemetry and troponin trending.     Syncope  History of STEMI  Patient here with 3 episodes of syncope after just completing cardiac rehab in morning. Patient with significant history of STEMI in June 2022. During syncopal episode patient denied chest pain, shortness of breath, or palpitation. Patient was slightly tachycardic to low 100s. Work-up in ED was rather unremarkable including normal trop, normal d-dimer, normal CXR, normal electrolytes. ECG with sinus tachycardia. Echo from previous admission with EF of 32%. On admission patient feeling much improved and no longer dizzy. Patient endorses not eating breakfast this morning however glucose was 137 on admission. Possibly etiologies of syncope include dysrhythmia vs ACS vs hypoglycemia (less likely) vs vasovagal.   -cardiology consult  -telemetry  -trend troponin   -BMP in am  -continue PTA medications   -ASA 81 mg   -Losartan 12.5mg (with hold parameters given soft BPs on admission)   -metoprolol 37.5mg BID   -rosuvastatin 40 mg    -Brilinta 90mg BID    Leukocytosis  Patient with elevated white count on admission -- 12.9. Denies recent illness or other infectious type concerns. Likely stress related in setting of syncope as above.   -CBC in am    Lactic acidosis  Likely secondary to syncope -- no infectious, or septic signs/symptoms.  -recheck lactic acid      Diet: Combination Diet Regular Diet Adult  DVT Prophylaxis: Enoxaparin (Lovenox) SQ  Spears Catheter: Not present  Central Lines: None  Cardiac Monitoring: ACTIVE order. Indication: Syncope- high cardiac risk (48 hours)  Code Status: Full Code    Clinically Significant Risk Factors Present  on Admission                          Disposition Plan     Likely home in 1-2 days pending telemetry monitoring    The patient's care was discussed with the Attending Physician, Dr. Yu.    Kiana Ortiz MD  Hospitalist Service  [facility]  Securely message with the Vocera Web Console (learn more here)  Text page via [facility] Paging/Directory         ______________________________________________________________________    Chief Complaint   Syncope    History is obtained from the patient, and chart review    History of Present Illness   Jordy Mcmanus is a 36 year old male who has has history of STEMI in June 2022 who was undergoing cardiac rehab this morning when he passed out.  Patient states while he was finishing the weight training portion of rehab he started feeling lightheaded/dizzy so he slept sat down on a chair when he then passed out.  CODE BLUE was called and when team arrived patient was alert but then had 2 more witnessed syncopal episodes that lasted about 10 seconds.  He was transferred to the emergency room for further work-up.  On admission patient is feeling well and no longer feeling lightheaded or dizzy.  He denies chest pain, shortness of breath, fever, chills, nausea, vomiting, diarrhea or any recent illness.    In regards to recent STEMI, patient was admitted to [facility] from urgent care after being found to have a STEMI. While he was in the Cath Lab he became hypotensive and so underwent emergent PCI.  Since that time patient has been doing well.  This was his second day of cardiac rehab. Patient denies history of smoking, alcohol or drug use. No known health issues prior to STEMI (although does not appear patient saw a doctor frequently). Father with history of angioplasty at age 62. Mother healthy without known heart disease.     Review of Systems    The 10 point Review of Systems is negative other than noted in the HPI or here.     Past Medical History    I  have reviewed this patient's medical history and updated it with pertinent information if needed.   History reviewed. History of STEMI in June 2022.     Past Surgical History   I have reviewed this patient's surgical history and updated it with pertinent information if needed.  History reviewed. No pertinent surgical history.    Social History   I have reviewed this patient's social history and updated it with pertinent information if needed.   No tobacco, alcohol or drug use.     Family History   Father with angioplasty at age 63 y/o. Mother healthy without known heart disease.    Prior to Admission Medications    ASA, losartan, metoprolol, rosuvastatin, brillinta     Allergies   No Known Allergies    Physical Exam   Vital Signs: Temp: 98.7  F (37.1  C) Temp src: Oral BP: 106/68 Pulse: 88   Resp: 17 SpO2: 100 % O2 Device: None (Room air)    Weight: 179 lbs 0 oz  Constitutional: awake, alert, cooperative, no apparent distress, and appears stated age  Eyes: Lids and lashes normal, pupils equal, round and reactive to light, extra ocular muscles intact, sclera clear, conjunctiva normal  ENT: Normocephalic, without obvious abnormality, external ears without lesions, oral pharynx with moist mucous membranes, tonsils without erythema or exudates, gums normal and good dentition.  Hematologic / Lymphatic: no cervical lymphadenopathy  Respiratory: No increased work of breathing, good air exchange, clear to auscultation bilaterally, no crackles or wheezing  Cardiovascular:  regular rate and rhythm, normal S1 and S2, no S3 or S4, and no murmur noted  GI: No scars, normal bowel sounds, soft, non-distended, non-tender, no masses palpated  Skin: two small superficial abrasions to forehead between eyebrows, no rashes, erythema   Musculoskeletal: There is no redness, warmth, or swelling of the joints.  Full range of motion noted.  Tone is normal.  Neurologic: Awake, alert, oriented to name, place and time.  Cranial nerves II-XII  are grossly intact.   Neuropsychiatric: General: normal, calm and normal eye contact  Level of consciousness: alert / normal  Affect: normal and pleasant    Data   Data reviewed today: I reviewed all medications, new labs and imaging results over the last 24 hours. I personally reviewed the EKG tracing showing sinus tachycardia.    Recent Labs   Lab 07/25/22  0737   WBC 12.9*   HGB 14.3   MCV 81         POTASSIUM 3.5   CHLORIDE 102   CO2 22   BUN 14   CR 1.25  1.25   ANIONGAP 13   MARY ANN 9.8   *   D-dimer <0.27  Troponin: 0.04    Recent Results (from the past 24 hour(s))   XR Chest 2 Views    Narrative    EXAM: XR CHEST 2 VIEWS  LOCATION: [facility]  DATE/TIME: 7/25/2022 8:18 AM    INDICATION: syncope  COMPARISON: None.      Impression    IMPRESSION: The lungs are clear. There is no pneumothorax or pleural effusion. The heart size and pulmonary vascularity are normal. No displaced bony fracture. No free air under the diaphragm. Negative chest radiograph.

## 2022-07-25 NOTE — SIGNIFICANT EVENT
Significant Event Note    Time of event: 7:37 AM July 25, 2022    Description of event:  Called to code blue for patient in cardiac rehab    Pt had just completed his exercise protocol  Suddenly felt dizzy in the chair   Passed out and hit his head on ground   When I arrived he said he felt dizzy and passed out 2 more times for about 10 seconds each.     Background: 35 yo M found to have 3 vessel dz in 6/2022 -- unstable at that time and so thrombectomy / emergent NIKOLAI placed.     /54   Pulse 105   Resp 16   SpO2 99%   Ex:   HEENT: cut over right eyebrow, bleeding. Pupils grossly appear nml and equal.   Neck no JVD  CV: RRR. No murmur.   Resp: breathing comfortably on room air   Abd:  Ext:  Neuro: LOC as noted; GCS less than 8 transiently but then increased to over 12. Otherwise, non-focal.     A:  Event - concern for syncope vs seizure    Considered starting CPR but events were transient     P:  Immediate transfer to ED   Handed off in-person to Dr. Baker, sven     Discussed with: code team, Dr. Baker in ED     Kye Hughes DO

## 2022-07-26 VITALS
BODY MASS INDEX: 24.24 KG/M2 | DIASTOLIC BLOOD PRESSURE: 80 MMHG | TEMPERATURE: 98.3 F | OXYGEN SATURATION: 100 % | SYSTOLIC BLOOD PRESSURE: 125 MMHG | RESPIRATION RATE: 26 BRPM | WEIGHT: 179 LBS | HEIGHT: 72 IN | HEART RATE: 93 BPM

## 2022-07-26 LAB
ANION GAP SERPL CALCULATED.3IONS-SCNC: 8 MMOL/L (ref 5–18)
BUN SERPL-MCNC: 9 MG/DL (ref 8–22)
CALCIUM SERPL-MCNC: 9.1 MG/DL (ref 8.5–10.5)
CHLORIDE BLD-SCNC: 107 MMOL/L (ref 98–107)
CO2 SERPL-SCNC: 24 MMOL/L (ref 22–31)
CREAT SERPL-MCNC: 0.84 MG/DL (ref 0.7–1.3)
ERYTHROCYTE [DISTWIDTH] IN BLOOD BY AUTOMATED COUNT: 13 % (ref 10–15)
GFR SERPL CREATININE-BSD FRML MDRD: >90 ML/MIN/1.73M2
GLUCOSE BLD-MCNC: 122 MG/DL (ref 70–125)
HCT VFR BLD AUTO: 41 % (ref 40–53)
HGB BLD-MCNC: 13.1 G/DL (ref 13.3–17.7)
MCH RBC QN AUTO: 26.3 PG (ref 26.5–33)
MCHC RBC AUTO-ENTMCNC: 32 G/DL (ref 31.5–36.5)
MCV RBC AUTO: 82 FL (ref 78–100)
PLATELET # BLD AUTO: 325 10E3/UL (ref 150–450)
POTASSIUM BLD-SCNC: 4.1 MMOL/L (ref 3.5–5)
RBC # BLD AUTO: 4.98 10E6/UL (ref 4.4–5.9)
SODIUM SERPL-SCNC: 139 MMOL/L (ref 136–145)
WBC # BLD AUTO: 8.2 10E3/UL (ref 4–11)

## 2022-07-26 PROCEDURE — 258N000003 HC RX IP 258 OP 636: Performed by: EMERGENCY MEDICINE

## 2022-07-26 PROCEDURE — 250N000013 HC RX MED GY IP 250 OP 250 PS 637: Performed by: INTERNAL MEDICINE

## 2022-07-26 PROCEDURE — 36415 COLL VENOUS BLD VENIPUNCTURE: CPT

## 2022-07-26 PROCEDURE — 85027 COMPLETE CBC AUTOMATED: CPT

## 2022-07-26 PROCEDURE — G0378 HOSPITAL OBSERVATION PER HR: HCPCS

## 2022-07-26 PROCEDURE — 250N000013 HC RX MED GY IP 250 OP 250 PS 637

## 2022-07-26 PROCEDURE — 99238 HOSP IP/OBS DSCHRG MGMT 30/<: CPT | Mod: GC | Performed by: FAMILY MEDICINE

## 2022-07-26 PROCEDURE — 80048 BASIC METABOLIC PNL TOTAL CA: CPT

## 2022-07-26 PROCEDURE — 96361 HYDRATE IV INFUSION ADD-ON: CPT

## 2022-07-26 RX ADMIN — TICAGRELOR 90 MG: 90 TABLET ORAL at 08:19

## 2022-07-26 RX ADMIN — SODIUM CHLORIDE: 9 INJECTION, SOLUTION INTRAVENOUS at 04:39

## 2022-07-26 RX ADMIN — METOPROLOL SUCCINATE 25 MG: 25 TABLET, EXTENDED RELEASE ORAL at 08:19

## 2022-07-26 RX ADMIN — ROSUVASTATIN CALCIUM 40 MG: 40 TABLET, COATED ORAL at 08:18

## 2022-07-26 RX ADMIN — LOSARTAN POTASSIUM 12.5 MG: 25 TABLET, FILM COATED ORAL at 08:19

## 2022-07-26 RX ADMIN — ASPIRIN 81 MG: 81 TABLET, CHEWABLE ORAL at 08:19

## 2022-07-26 ASSESSMENT — ACTIVITIES OF DAILY LIVING (ADL)
ADLS_ACUITY_SCORE: 35

## 2022-07-26 NOTE — ED NOTES
PT slept throughout the night. Offers no complaints. VSS. IVF infusing at 125ml/hr. Will continue to monitor and assess.

## 2022-07-26 NOTE — PLAN OF CARE
Problem: Plan of Care - These are the overarching goals to be used throughout the patient stay.    Goal: Plan of Care Review/Shift Note  Description: The Plan of Care Review/Shift note should be completed every shift.  The Outcome Evaluation is a brief statement about your assessment that the patient is improving, declining, or no change.  This information will be displayed automatically on your shift note.  Outcome: Adequate for Care Transition  Flowsheets (Taken 7/26/2022 0910)  Plan of Care Reviewed With: patient   Goal Outcome Evaluation:    Plan of Care Reviewed With: patient     A/O x4    VSS    Denies pain    Up independent    Wants to discharge home. Spoke with cardiology who is ok with discharge, left recommendations. Resident present and evaluated pt and discharged to home.

## 2022-07-26 NOTE — PLAN OF CARE
Problem: Plan of Care - These are the overarching goals to be used throughout the patient stay.    Goal: Plan of Care Review/Shift Note  Description: The Plan of Care Review/Shift note should be completed every shift.  The Outcome Evaluation is a brief statement about your assessment that the patient is improving, declining, or no change.  This information will be displayed automatically on your shift note.  Outcome: Ongoing, Progressing     Problem: Plan of Care - These are the overarching goals to be used throughout the patient stay.    Goal: Absence of Hospital-Acquired Illness or Injury  Intervention: Identify and Manage Fall Risk  Recent Flowsheet Documentation  Taken 7/25/2022 2000 by Iram Hammer RN  Safety Promotion/Fall Prevention: activity supervised  Taken 7/25/2022 1500 by Iram Hammer RN  Safety Promotion/Fall Prevention: activity supervised  Goal: Readiness for Transition of Care  Recent Flowsheet Documentation  Taken 7/25/2022 1931 by Iram Hammer RN  Anticipated Changes Related to Illness: none  Concerns to be Addressed: all concerns addressed in this encounter  Barriers to Discharge: none  Intervention: Mutually Develop Transition Plan  Recent Flowsheet Documentation  Taken 7/25/2022 1931 by Iram Hammer RN  Discharge Coordination/Progress: All goals met  Anticipated Changes Related to Illness: none  Concerns to be Addressed: all concerns addressed in this encounter   Goal Outcome Evaluation:      VSS. Pt cont to be NSR. Tolerating IV fluids without adverse effect. Ambulating to bathroom without difficulty.

## 2022-07-27 ENCOUNTER — PATIENT OUTREACH (OUTPATIENT)
Dept: CARE COORDINATION | Facility: CLINIC | Age: 37
End: 2022-07-27

## 2022-07-27 DIAGNOSIS — Z71.89 OTHER SPECIFIED COUNSELING: ICD-10-CM

## 2022-07-27 NOTE — PROGRESS NOTES
"Clinic Care Coordination Contact  [facility]: Post-Discharge Note  SITUATION                                                      Admission:    Admission Date: 07/25/22   Reason for Admission: Syncope, unspecified syncope type  Discharge:   Discharge Date: 07/26/22  Discharge Diagnosis: Syncope, unspecified syncope type    BACKGROUND                                                      Per hospital discharge summary and inpatient provider notes:    Jordy Mcmanus is a 36 year old male with a past medical history of STEMI, chest pain, and ischemic cardiomyopathy who presents for evaluation of syncope. Patient arrived from cardiac rehab fter [facility] was called. Patient experienced three episodes of syncope. He also fell forward out of his chair and hit his head, causing an abrasion between his eyes. Patient says he thinks he passed out because of the mask they have him wear during exercise.    ASSESSMENT      Enrollment  Primary Care Care Coordination Status: Not a Candidate    Discharge Assessment  How are you doing now that you are home?: \"Doing good\"  How are your symptoms? (Red Flag symptoms escalate to triage hotline per guidelines): Improved  Do you feel your condition is stable enough to be safe at home until your provider visit?: Yes  Does the patient have their discharge instructions? : Yes  Does the patient have questions regarding their discharge instructions? : No  Were you started on any new medications or were there changes to any of your previous medications? : Yes  Does the patient have all of their medications?: Yes  Do you have questions regarding any of your medications? : No  Do you have all of your needed medical supplies or equipment (DME)?  (i.e. oxygen tank, CPAP, cane, etc.): Yes  Discharge follow-up appointment scheduled within 14 calendar days? : No  Is patient agreeable to assistance with scheduling? : No    Post-op (W CTA Only)  If the patient had a surgery or procedure, do " they have any questions for a nurse?: No    PLAN                                                      Outpatient Plan:    Follow up with Primary Care Provider    Future Appointments   Date Time Provider Department Center   8/1/2022  6:30 AM [facility]   8/3/2022  6:30 AM [facility]   8/8/2022  6:30 AM [facility] 1 [facility]   8/10/2022  6:30 AM [facility] 1 [facility]   8/15/2022  6:30 AM [facility] 1 [facility] N   8/17/2022  6:30 AM [facility] 1 [facility] N   8/22/2022  6:30 AM [facility] 1 [facility] N   8/24/2022  6:30 [facility]   8/29/2022  6:30 AM [facility] 1 [facility] N   8/31/2022  6:30 AM [facility] 1 [facility] N   9/7/2022  6:30 AM [facility] 1 [facility] N   9/12/2022  6:30 AM [facility] 1 [facility] N   9/14/2022  6:30 AM [facility] 1 [facility] N   9/19/2022  6:30 AM [facility] 1 [facility] N   9/21/2022  6:30 AM [facility] 1 [facility]   9/26/2022  6:30 AM [facility] 1 [facility]   9/28/2022  6:30 AM [facility] 1 [facility] N   10/3/2022  6:30 AM [facility] 1 [facility]   10/5/2022  6:30 AM [facility]   10/10/2022  6:30 AM [facility]   10/12/2022  6:30 AM [facility] 1 [facility]   10/17/2022  6:30 AM [facility] 1 [facility]   10/19/2022  6:30 AM [facility] 1 [facility]   10/24/2022  6:30 AM [facility] 1 [facility]   10/26/2022  6:30 AM [facility] 1 [facility]   10/31/2022  6:30 AM [facility] 1 [facility]    11/2/2022  6:30 AM [facility] 1 [facility]   11/7/2022  6:30 AM [facility] 1 [facility]   11/9/2022  6:30 AM [facility]   11/14/2022  6:30 AM [facility]         For any urgent concerns, please contact our 24 hour nurse triage line: [phone] ([phone])         CHELA Dodd  [phone]  [facility]  [facility]

## 2022-08-03 ENCOUNTER — MEDICAL CORRESPONDENCE (OUTPATIENT)
Dept: CARDIAC REHAB | Facility: HOSPITAL | Age: 37
End: 2022-08-03

## 2022-10-03 ENCOUNTER — HEALTH MAINTENANCE LETTER (OUTPATIENT)
Age: 37
End: 2022-10-03

## 2023-08-13 ENCOUNTER — HEALTH MAINTENANCE LETTER (OUTPATIENT)
Age: 38
End: 2023-08-13

## 2024-10-06 ENCOUNTER — HEALTH MAINTENANCE LETTER (OUTPATIENT)
Age: 39
End: 2024-10-06